# Patient Record
Sex: FEMALE | Race: WHITE | NOT HISPANIC OR LATINO
[De-identification: names, ages, dates, MRNs, and addresses within clinical notes are randomized per-mention and may not be internally consistent; named-entity substitution may affect disease eponyms.]

---

## 2021-02-10 ENCOUNTER — APPOINTMENT (OUTPATIENT)
Dept: HEMATOLOGY ONCOLOGY | Facility: CLINIC | Age: 51
End: 2021-02-10
Payer: COMMERCIAL

## 2021-02-10 VITALS
SYSTOLIC BLOOD PRESSURE: 118 MMHG | TEMPERATURE: 98.5 F | RESPIRATION RATE: 18 BRPM | BODY MASS INDEX: 24.64 KG/M2 | HEART RATE: 96 BPM | WEIGHT: 157 LBS | HEIGHT: 67 IN | OXYGEN SATURATION: 97 % | DIASTOLIC BLOOD PRESSURE: 70 MMHG

## 2021-02-10 DIAGNOSIS — Z86.39 PERSONAL HISTORY OF OTHER ENDOCRINE, NUTRITIONAL AND METABOLIC DISEASE: ICD-10-CM

## 2021-02-10 DIAGNOSIS — R59.1 GENERALIZED ENLARGED LYMPH NODES: ICD-10-CM

## 2021-02-10 DIAGNOSIS — Z86.69 PERSONAL HISTORY OF OTHER DISEASES OF THE NERVOUS SYSTEM AND SENSE ORGANS: ICD-10-CM

## 2021-02-10 PROCEDURE — 99072 ADDL SUPL MATRL&STAF TM PHE: CPT

## 2021-02-10 PROCEDURE — 99204 OFFICE O/P NEW MOD 45 MIN: CPT

## 2021-02-10 NOTE — HISTORY OF PRESENT ILLNESS
[de-identified] : 49 yo F with recently detected bilateral neck LAD and h/o sinus issues (URI etc..), hypothyroid, and tinnitus but otherwise healthy.  States she was in her usual state of health until about 1 month ago when she felt left sided cervical LN-they are not painful and do not interfere with ROM and not associated with recent infection/URI etc.. Perhaps some dyshphagia but states that she also has intermittent GERD and is now very nervous about what the LAD is.  No other associated symptoms-no weight loss, appetite loss, pain, SOB/N/V, night sweats (aside from related to menopause).  She denies any other lumps/bumps elsewhere and no pain or other somatic symptoms.  \par \par Patient works for [employer] from home in [place].  She is  with 2 children.  Denies any personal history of cancer or significant family history.  No recent exposures or prior radiation etc.. \par \par Patients primary care doctor referred patient for w/u after CT Neck revealed 3 x 1 cm Left cervical LN and 2cm R level III cervical LN on 2/2/21 in addition to a thyroid U/S done on 2/2/21 that identified the 2.7cm left sided LN in addition to 4.6 cm L thyroid lobe and 4cm R thyroid lobe without solid or cystic components. \par \par LAD has not been biopsied to date.

## 2021-02-10 NOTE — ASSESSMENT
[FreeTextEntry1] : 51 yo F with bilateral cervical neck LAD L> R up to 3cm which approaches pathological size but with no associated symptoms or concerns.  DDX include reactive LAD, lymphoma, head and neck primary, less likely thyroid primary, versus other carcinoma \par \par 1.  Reviewed with patient the need for additional w/u and the need to obtain tissue specifically-patient was in agreement.  She has seen Dr. Stein for years for sinus related issues and is comfortable going back to him for additional w/u.  \par \par Patient will need a pecutaneous biopsy to establish/refute diagnoses. \par -patient to return to Dr. Stein, as an established patient may get in quickly.  \par -patient was also given f/u options to see Suresh Pelayo or Alisha.  \par \par 2.  Age appropriate cancer screening \par -breast exam/mammo \par -colonoscopy \par \par 3.  hypothyroid \par -on synthroid \par \par 4. Tinnitus \par -occasionally takes triamterene-HCTZ \par \par 54.  Will obtain lab work once a diagnosis is made. \par \par RTC 3 weeks. \par

## 2021-02-17 ENCOUNTER — APPOINTMENT (OUTPATIENT)
Dept: OTOLARYNGOLOGY | Facility: CLINIC | Age: 51
End: 2021-02-17
Payer: COMMERCIAL

## 2021-02-17 VITALS
WEIGHT: 158 LBS | OXYGEN SATURATION: 98 % | DIASTOLIC BLOOD PRESSURE: 80 MMHG | SYSTOLIC BLOOD PRESSURE: 118 MMHG | HEIGHT: 68 IN | BODY MASS INDEX: 23.95 KG/M2 | TEMPERATURE: 97.9 F | HEART RATE: 90 BPM

## 2021-02-17 DIAGNOSIS — Z72.89 OTHER PROBLEMS RELATED TO LIFESTYLE: ICD-10-CM

## 2021-02-17 DIAGNOSIS — Z82.49 FAMILY HISTORY OF ISCHEMIC HEART DISEASE AND OTHER DISEASES OF THE CIRCULATORY SYSTEM: ICD-10-CM

## 2021-02-17 DIAGNOSIS — R59.0 LOCALIZED ENLARGED LYMPH NODES: ICD-10-CM

## 2021-02-17 DIAGNOSIS — Z87.891 PERSONAL HISTORY OF NICOTINE DEPENDENCE: ICD-10-CM

## 2021-02-17 DIAGNOSIS — Z78.9 OTHER SPECIFIED HEALTH STATUS: ICD-10-CM

## 2021-02-17 DIAGNOSIS — Z11.9 ENCOUNTER FOR SCREENING FOR INFECTIOUS AND PARASITIC DISEASES, UNSPECIFIED: ICD-10-CM

## 2021-02-17 PROCEDURE — 99204 OFFICE O/P NEW MOD 45 MIN: CPT | Mod: 25

## 2021-02-17 PROCEDURE — 99072 ADDL SUPL MATRL&STAF TM PHE: CPT

## 2021-02-17 PROCEDURE — 31575 DIAGNOSTIC LARYNGOSCOPY: CPT

## 2021-02-18 PROBLEM — Z82.49 FAMILY HISTORY OF CARDIAC DISORDER: Status: ACTIVE | Noted: 2021-02-18

## 2021-02-18 PROBLEM — Z72.89 CONSUMES ALCOHOL: Status: ACTIVE | Noted: 2021-02-18

## 2021-02-18 PROBLEM — Z78.9 CAFFEINE USE: Status: ACTIVE | Noted: 2021-02-18

## 2021-02-18 PROBLEM — Z87.891 FORMER SMOKER: Status: ACTIVE | Noted: 2021-02-18

## 2021-02-18 RX ORDER — LEVOTHYROXINE SODIUM 112 UG/1
112 TABLET ORAL
Refills: 0 | Status: ACTIVE | COMMUNITY

## 2021-02-18 NOTE — PROCEDURE
[Image(s) Captured] : image(s) captured and filed [Unable to Cooperate with Mirror] : patient unable to cooperate with mirror [de-identified] : Flexible fiberoptic laryngoscope advanced orally, tonsillar pillars and tonsils/tonsil bed visualized, oropharyngeal and hypopharyngeal walls and BOT, epiglottis all visualized, no masses, no fungating/ulcerating lesions evident; larynx visualized, true vocal cords abduct and adduct and meet in the midline no leuko/erythroplakic lesions . No pooling of secretions.

## 2021-02-18 NOTE — PHYSICAL EXAM
[Laryngoscopy Performed] : laryngoscopy was performed, see procedure section for findings [de-identified] : occipital scaly skin lesion [Normal] : assessment of respiratory effort is normal [de-identified] : bilateral lad 1-3cm, mobile, solid, non tender, levels 2-5

## 2021-02-18 NOTE — DATA REVIEWED
[de-identified] : us report [de-identified] : CT report [de-identified] : [facility] office visit report

## 2021-02-18 NOTE — HISTORY OF PRESENT ILLNESS
[de-identified] : 51 yo F , who noticed a lump of the right neck X 4 weeks. Denies recent infection, denies sore throat, denies smoking/excessive EtOH, denies WL/F/NS. she has hx of chronic sinusitis, hypothyroidism. She reports having a skin lesions - occipital + hand palm X couple of months- never evaluated. Never had FNA of the neck LN. She also reports symptoms of GERD. \par  \par 2/2/21 CT Neck (report) revealed multiple prominent level III LNs, R>L. 3 x 1 cm right cervical LN and 2 x 1 cm L cervical LN. 2/2/21 US thyroid (report): 2.7cm left level III LN and adjacent 1.4 cm LN – suggesting reactive LNs.  in addition to 4.8 cm L thyroid lobe and 4.6 cm R thyroid lobe without solid or cystic components.

## 2021-02-18 NOTE — ASSESSMENT
[FreeTextEntry1] : 51yo F, p/w ryan. neck LAD X 4 weeks, up to 3 cm per imaging, never been fna/bx. She also has an occipital skin lesion that had never been evaluated. \par We thoroughly discussed the diagnosis with the patient and explained the required workup, f/u and treatment options.\par \par \par Plan:\par - Obtain CD of CT neck and US thyroid from [facility]\par - Obtain auth FNA LN, f/u with results. \par - Refer to GI Dr. Ramos\par - f/u derm re scalp\par - RTC sooner should any worrisome symptoms develop.\par - Pt verbalized understanding of above.\par

## 2021-02-25 ENCOUNTER — RESULT REVIEW (OUTPATIENT)
Age: 51
End: 2021-02-25

## 2021-02-25 ENCOUNTER — APPOINTMENT (OUTPATIENT)
Dept: ULTRASOUND IMAGING | Facility: HOSPITAL | Age: 51
End: 2021-02-25
Payer: COMMERCIAL

## 2021-02-25 ENCOUNTER — OUTPATIENT (OUTPATIENT)
Dept: OUTPATIENT SERVICES | Facility: HOSPITAL | Age: 51
LOS: 1 days | End: 2021-02-25
Payer: COMMERCIAL

## 2021-02-25 DIAGNOSIS — Z98.89 OTHER SPECIFIED POSTPROCEDURAL STATES: Chronic | ICD-10-CM

## 2021-02-25 PROCEDURE — 88173 CYTOPATH EVAL FNA REPORT: CPT | Mod: 26

## 2021-02-25 PROCEDURE — 10005 FNA BX W/US GDN 1ST LES: CPT

## 2021-02-25 PROCEDURE — 88305 TISSUE EXAM BY PATHOLOGIST: CPT

## 2021-02-25 PROCEDURE — 10006 FNA BX W/US GDN EA ADDL: CPT

## 2021-02-25 PROCEDURE — 88173 CYTOPATH EVAL FNA REPORT: CPT

## 2021-02-25 PROCEDURE — 88305 TISSUE EXAM BY PATHOLOGIST: CPT | Mod: 26

## 2021-02-26 LAB
NON-GYNECOLOGICAL CYTOLOGY STUDY: SIGNIFICANT CHANGE UP
NON-GYNECOLOGICAL CYTOLOGY STUDY: SIGNIFICANT CHANGE UP

## 2021-03-02 ENCOUNTER — APPOINTMENT (OUTPATIENT)
Dept: HEMATOLOGY ONCOLOGY | Facility: CLINIC | Age: 51
End: 2021-03-02
Payer: COMMERCIAL

## 2021-03-04 NOTE — ASSESSMENT
[FreeTextEntry1] : 49 yo F with bilateral cervical neck LAD L> R up to 3cm which approaches pathological size but with no associated symptoms or concerns.  DDX include reactive LAD, lymphoma, head and neck primary, less likely thyroid primary, versus other carcinoma \par \par 1.  Reviewed with patient the need for additional w/u and the need to obtain tissue specifically-patient was in agreement.  She has seen Dr. Stein for years for sinus related issues and is comfortable going back to him for additional w/u.  \par \par Patient will need a pecutaneous biopsy to establish/refute diagnoses. \par -patient to return to Dr. Stein, as an established patient may get in quickly.  \par -patient was also given f/u options to see Suresh Pelayo or Alisha.  \par \par 2.  Age appropriate cancer screening \par -breast exam/mammo \par -colonoscopy \par \par 3.  hypothyroid \par -on synthroid \par \par 4. Tinnitus \par -occasionally takes triamterene-HCTZ \par \par 54.  Will obtain lab work once a diagnosis is made. \par \par RTC 3 weeks. \par

## 2021-03-04 NOTE — HISTORY OF PRESENT ILLNESS
[de-identified] : 49 yo F with recently detected bilateral neck LAD and h/o sinus issues (URI etc..), hypothyroid, and tinnitus but otherwise healthy.  States she was in her usual state of health until about 1 month ago when she felt left sided cervical LN-they are not painful and do not interfere with ROM and not associated with recent infection/URI etc.. Perhaps some dyshphagia but states that she also has intermittent GERD and is now very nervous about what the LAD is.  No other associated symptoms-no weight loss, appetite loss, pain, SOB/N/V, night sweats (aside from related to menopause).  She denies any other lumps/bumps elsewhere and no pain or other somatic symptoms.  \par \par Patient works for [employer] from home in [place].  She is  with 2 children.  Denies any personal history of cancer or significant family history.  No recent exposures or prior radiation etc.. \par \par Patients primary care doctor referred patient for w/u after CT Neck revealed 3 x 1 cm Left cervical LN and 2cm R level III cervical LN on 2/2/21 in addition to a thyroid U/S done on 2/2/21 that identified the 2.7cm left sided LN in addition to 4.6 cm L thyroid lobe and 4cm R thyroid lobe without solid or cystic components. \par \par LAD has not been biopsied to date.  \par \par 2/2/21 CT SOFT TISSUE NECK WO CONTRAST:\par - Multiple level III lymph nodes are noted. These are more numerous and somewhat larger on the right than left measuring up to 3 x 1 cm on the right and 2 x 1 cm on the left. These prominent lymph nodes appear uniform in density as visualized without intravenous contrast.\par - Correlated with clinical palpation. No discrete findings to indicate caseous necrosis.\par - No pharyngeal wall lesion is identified.\par - Unenhanced salivary glands appear unremarkable\par - Absence of portion of the medial wall of the right and left maxillary sinus and floor of the ethmoid sinus, presumed to be secondary to prior sinus surgery\par - Etiology of the cervical lymphadenopathy cannot be determined from the current CT exam. Correlate with clinical physical exam. Findings may be reactive secondary to infectious or inflammatory process but lymphoproliferative/neoplastic etiologies cannot be excluded. \par - Further more detailed soft tissue evaluation can be obtained with MRI of the soft tissues of the neck pre and post gadolinium\par \par 2/2/21 THYROID US:\par - Representative images demonstrate the right lobe to measure 4.6 x 1.9 x 1.5 cm and the left lobe to measure 4.8 x 1.4 x 1.2 cm. The isthmus measures 0.3 cm.\par - Thyroid gland is heterogeneous in echogenicity and mildly hypervascular but without discrete cystic or solid nodules.\par - Left level III lymph node w/o fatty hilum measuring 2.7 cm in length by diameter of 1.2 x 0.7 cm. Second adjacent lymph node without fatty hilum measuring 1.4 x 1.0 x 0.4 cm. Findings would suggest reactive lymph nodes.\par - Nonspecific reactive lymphadenopathy. Correlate clinically.\par - ACR TI-RADS\par - TR 1-2 (Benign) up to 2% cancer risk\par - TR 3 (mildly suspicious) up to 5% cancer risk. FNA >= 2.5 cm and Followup if >=1.5 cm. Followup: 1,3 and 5 years\par - TR 4 (moderately suspicious) 5- 20% cancer risk. FNA >= 1.5 cm and Followup if >= 1.0 cm. Follow up: 1,3 and 5 years\par - TR 5 (highly suspicious) > 20% cancer risk. FNA >= 1.0 cm and followup if >= 0.5 cm. Follow up: Annual follow-up for up to 5 years\par \par 2/25/21 LYMPH NODE, LEFT NECK, LEVEL III, LEVEL III, FINE NEEDLE ASPIRATION BX:\par - NEGATIVE FOR MALIGNANT CELLS\par - Heterogeneous population of lymphocytes including tingible body\par macrophages\par  [de-identified] : 3/2/21: Patient returns for follow-up visit. He saw Dr. Pelayo and is s/p FNA to left neck lymph nodes on 2/25/21, negative for malignant cells.

## 2021-03-09 ENCOUNTER — LABORATORY RESULT (OUTPATIENT)
Age: 51
End: 2021-03-09

## 2021-03-09 ENCOUNTER — APPOINTMENT (OUTPATIENT)
Dept: HEMATOLOGY ONCOLOGY | Facility: CLINIC | Age: 51
End: 2021-03-09
Payer: COMMERCIAL

## 2021-03-09 VITALS
SYSTOLIC BLOOD PRESSURE: 104 MMHG | HEART RATE: 82 BPM | DIASTOLIC BLOOD PRESSURE: 71 MMHG | HEIGHT: 68 IN | TEMPERATURE: 98.2 F | BODY MASS INDEX: 24.71 KG/M2 | OXYGEN SATURATION: 97 % | RESPIRATION RATE: 18 BRPM | WEIGHT: 163 LBS

## 2021-03-09 PROCEDURE — 99214 OFFICE O/P EST MOD 30 MIN: CPT

## 2021-03-09 PROCEDURE — 99072 ADDL SUPL MATRL&STAF TM PHE: CPT

## 2021-03-09 NOTE — REVIEW OF SYSTEMS
[Swollen Glands] : swollen glands [Fever] : no fever [Night Sweats] : no night sweats [Fatigue] : no fatigue [Eye Pain] : no eye pain [Vision Problems] : no vision problems [Dysphagia] : no dysphagia [Hoarseness] : no hoarseness [Odynophagia] : no odynophagia [Chest Pain] : no chest pain [Shortness Of Breath] : no shortness of breath [Wheezing] : no wheezing [Cough] : no cough [SOB on Exertion] : no shortness of breath during exertion [Abdominal Pain] : no abdominal pain [Vomiting] : no vomiting [Constipation] : no constipation [Diarrhea] : no diarrhea [Incontinence] : no incontinence [Joint Pain] : no joint pain [Joint Stiffness] : no joint stiffness [Muscle Pain] : no muscle pain [Skin Rash] : no skin rash [Confused] : no confusion

## 2021-03-09 NOTE — PHYSICAL EXAM
[Normal] : affect appropriate [de-identified] : n [de-identified] : no LAD  [de-identified] : NO LAD in inguinals

## 2021-03-09 NOTE — HISTORY OF PRESENT ILLNESS
[de-identified] : 49 yo F with recently detected bilateral neck LAD and h/o sinus issues (URI etc..), hypothyroid, and tinnitus but otherwise healthy.  States she was in her usual state of health until about 1 month ago when she felt left sided cervical LN-they are not painful and do not interfere with ROM and not associated with recent infection/URI etc.. Perhaps some dyshphagia but states that she also has intermittent GERD and is now very nervous about what the LAD is.  No other associated symptoms-no weight loss, appetite loss, pain, SOB/N/V, night sweats (aside from related to menopause).  She denies any other lumps/bumps elsewhere and no pain or other somatic symptoms.  \par \par Patient works for [employer] from home in [place].  She is  with 2 children.  Denies any personal history of cancer or significant family history.  No recent exposures or prior radiation etc.. \par \par Patients primary care doctor referred patient for w/u after CT Neck revealed 3 x 1 cm Left cervical LN and 2cm R level III cervical LN on 2/2/21 in addition to a thyroid U/S done on 2/2/21 that identified the 2.7cm left sided LN in addition to 4.6 cm L thyroid lobe and 4cm R thyroid lobe without solid or cystic components. \par \par LAD has not been biopsied to date.  \par \par 2/2/21 CT SOFT TISSUE NECK WO CONTRAST:\par - Multiple level III lymph nodes are noted. These are more numerous and somewhat larger on the right than left measuring up to 3 x 1 cm on the right and 2 x 1 cm on the left. These prominent lymph nodes appear uniform in density as visualized without intravenous contrast.\par - Correlated with clinical palpation. No discrete findings to indicate caseous necrosis.\par - No pharyngeal wall lesion is identified.\par - Unenhanced salivary glands appear unremarkable\par - Absence of portion of the medial wall of the right and left maxillary sinus and floor of the ethmoid sinus, presumed to be secondary to prior sinus surgery\par - Etiology of the cervical lymphadenopathy cannot be determined from the current CT exam. Correlate with clinical physical exam. Findings may be reactive secondary to infectious or inflammatory process but lymphoproliferative/neoplastic etiologies cannot be excluded. \par - Further more detailed soft tissue evaluation can be obtained with MRI of the soft tissues of the neck pre and post gadolinium\par \par 2/2/21 THYROID US:\par - Representative images demonstrate the right lobe to measure 4.6 x 1.9 x 1.5 cm and the left lobe to measure 4.8 x 1.4 x 1.2 cm. The isthmus measures 0.3 cm.\par - Thyroid gland is heterogeneous in echogenicity and mildly hypervascular but without discrete cystic or solid nodules.\par - Left level III lymph node w/o fatty hilum measuring 2.7 cm in length by diameter of 1.2 x 0.7 cm. Second adjacent lymph node without fatty hilum measuring 1.4 x 1.0 x 0.4 cm. Findings would suggest reactive lymph nodes.\par - Nonspecific reactive lymphadenopathy. Correlate clinically.\par - ACR TI-RADS\par - TR 1-2 (Benign) up to 2% cancer risk\par - TR 3 (mildly suspicious) up to 5% cancer risk. FNA >= 2.5 cm and Followup if >=1.5 cm. Followup: 1,3 and 5 years\par - TR 4 (moderately suspicious) 5- 20% cancer risk. FNA >= 1.5 cm and Followup if >= 1.0 cm. Follow up: 1,3 and 5 years\par - TR 5 (highly suspicious) > 20% cancer risk. FNA >= 1.0 cm and followup if >= 0.5 cm. Follow up: Annual follow-up for up to 5 years\par \par 2/25/21 LYMPH NODE, LEFT NECK, LEVEL III, LEVEL III, FINE NEEDLE ASPIRATION BX:\par - NEGATIVE FOR MALIGNANT CELLS\par - Heterogeneous population of lymphocytes including tingible body\par macrophages\par  [de-identified] : 3/2/21: Patient returns for follow-up visit. He saw Dr. Pelayo and is s/p FNA to left neck lymph nodes on 2/25/21, negative for malignant cells.\par Mass on neck has decreased significantly.  Patient is concerned about lumps/bumps in Left groin.  No associated symptoms of fevers/chills, pain, weight loss etc.. feels well otehrwise.

## 2021-03-09 NOTE — ASSESSMENT
[FreeTextEntry1] : 51 yo F with previous bilateral cervical neck LAD L> R up to 3cm with no evidence of malignancy on FNA (US guided).  DDX include reactive LAD, lymphoma, head and neck primary, less likely thyroid primary, versus other carcinoma \par \par 1.  Reviewed possibility for false negative but mass has abated without any other symptoms.  Most likely this was reactive to transient infection or other process.  \par -will reassess with CT NEck/Chest for LAD in two months.  if negative, would recommend age appropriate cancer screening.  (mammo UTD, needs colonoscopy) \par -f/u blood work from today.  \par \par 2.  hypothyroid \par -on synthroid \par \par 3. Tinnitus \par -occasionally takes triamterene-HCTZ \par \par RTC 2 months \par  [Curative] : Goals of care discussed with patient: Curative

## 2021-03-16 LAB
BASOPHILS # BLD AUTO: 0.03 K/UL
BASOPHILS NFR BLD AUTO: 0.5 %
EOSINOPHIL # BLD AUTO: 0.09 K/UL
EOSINOPHIL NFR BLD AUTO: 1.4 %
HCT VFR BLD CALC: 38.3 %
HGB BLD-MCNC: 13.1 G/DL
IMM GRANULOCYTES NFR BLD AUTO: 0.2 %
LYMPHOCYTES # BLD AUTO: 2.46 K/UL
LYMPHOCYTES NFR BLD AUTO: 38.5 %
MAN DIFF?: NORMAL
MCHC RBC-ENTMCNC: 31 PG
MCHC RBC-ENTMCNC: 34.2 GM/DL
MCV RBC AUTO: 90.5 FL
MONOCYTES # BLD AUTO: 0.42 K/UL
MONOCYTES NFR BLD AUTO: 6.6 %
NEUTROPHILS # BLD AUTO: 3.38 K/UL
NEUTROPHILS NFR BLD AUTO: 52.8 %
PLATELET # BLD AUTO: 248 K/UL
RBC # BLD: 4.23 M/UL
RBC # FLD: 12.9 %
WBC # FLD AUTO: 6.39 K/UL

## 2022-10-03 ENCOUNTER — RESULT REVIEW (OUTPATIENT)
Age: 52
End: 2022-10-03

## 2023-09-11 ENCOUNTER — EMERGENCY (EMERGENCY)
Facility: HOSPITAL | Age: 53
LOS: 1 days | Discharge: HOME | End: 2023-09-11
Attending: EMERGENCY MEDICINE | Admitting: STUDENT IN AN ORGANIZED HEALTH CARE EDUCATION/TRAINING PROGRAM
Payer: COMMERCIAL

## 2023-09-11 VITALS
RESPIRATION RATE: 18 BRPM | HEART RATE: 73 BPM | OXYGEN SATURATION: 99 % | DIASTOLIC BLOOD PRESSURE: 66 MMHG | SYSTOLIC BLOOD PRESSURE: 113 MMHG | TEMPERATURE: 99 F

## 2023-09-11 DIAGNOSIS — Z98.89 OTHER SPECIFIED POSTPROCEDURAL STATES: Chronic | ICD-10-CM

## 2023-09-11 LAB
ALBUMIN SERPL ELPH-MCNC: 4.3 G/DL — SIGNIFICANT CHANGE UP (ref 3.3–5)
ALP SERPL-CCNC: 60 U/L — SIGNIFICANT CHANGE UP (ref 40–120)
ALT FLD-CCNC: 13 U/L — SIGNIFICANT CHANGE UP (ref 4–33)
ANION GAP SERPL CALC-SCNC: 14 MMOL/L — SIGNIFICANT CHANGE UP (ref 7–14)
AST SERPL-CCNC: 19 U/L — SIGNIFICANT CHANGE UP (ref 4–32)
B BURGDOR C6 AB SER-ACNC: NEGATIVE — SIGNIFICANT CHANGE UP
B BURGDOR IGG+IGM SER-ACNC: 0.18 INDEX — SIGNIFICANT CHANGE UP (ref 0.01–0.89)
BASOPHILS # BLD AUTO: 0.03 K/UL — SIGNIFICANT CHANGE UP (ref 0–0.2)
BASOPHILS NFR BLD AUTO: 0.6 % — SIGNIFICANT CHANGE UP (ref 0–2)
BILIRUB SERPL-MCNC: 0.5 MG/DL — SIGNIFICANT CHANGE UP (ref 0.2–1.2)
BUN SERPL-MCNC: 13 MG/DL — SIGNIFICANT CHANGE UP (ref 7–23)
CALCIUM SERPL-MCNC: 9.9 MG/DL — SIGNIFICANT CHANGE UP (ref 8.4–10.5)
CHLORIDE SERPL-SCNC: 100 MMOL/L — SIGNIFICANT CHANGE UP (ref 98–107)
CO2 SERPL-SCNC: 21 MMOL/L — LOW (ref 22–31)
CREAT SERPL-MCNC: 0.8 MG/DL — SIGNIFICANT CHANGE UP (ref 0.5–1.3)
EGFR: 88 ML/MIN/1.73M2 — SIGNIFICANT CHANGE UP
EOSINOPHIL # BLD AUTO: 0.07 K/UL — SIGNIFICANT CHANGE UP (ref 0–0.5)
EOSINOPHIL NFR BLD AUTO: 1.3 % — SIGNIFICANT CHANGE UP (ref 0–6)
GLUCOSE SERPL-MCNC: 86 MG/DL — SIGNIFICANT CHANGE UP (ref 70–99)
HCT VFR BLD CALC: 36.1 % — SIGNIFICANT CHANGE UP (ref 34.5–45)
HGB BLD-MCNC: 12.1 G/DL — SIGNIFICANT CHANGE UP (ref 11.5–15.5)
IANC: 1.99 K/UL — SIGNIFICANT CHANGE UP (ref 1.8–7.4)
IMM GRANULOCYTES NFR BLD AUTO: 0.2 % — SIGNIFICANT CHANGE UP (ref 0–0.9)
LYMPHOCYTES # BLD AUTO: 2.52 K/UL — SIGNIFICANT CHANGE UP (ref 1–3.3)
LYMPHOCYTES # BLD AUTO: 46.2 % — HIGH (ref 13–44)
MCHC RBC-ENTMCNC: 30 PG — SIGNIFICANT CHANGE UP (ref 27–34)
MCHC RBC-ENTMCNC: 33.5 GM/DL — SIGNIFICANT CHANGE UP (ref 32–36)
MCV RBC AUTO: 89.4 FL — SIGNIFICANT CHANGE UP (ref 80–100)
MONOCYTES # BLD AUTO: 0.83 K/UL — SIGNIFICANT CHANGE UP (ref 0–0.9)
MONOCYTES NFR BLD AUTO: 15.2 % — HIGH (ref 2–14)
NEUTROPHILS # BLD AUTO: 1.99 K/UL — SIGNIFICANT CHANGE UP (ref 1.8–7.4)
NEUTROPHILS NFR BLD AUTO: 36.5 % — LOW (ref 43–77)
NRBC # BLD: 0 /100 WBCS — SIGNIFICANT CHANGE UP (ref 0–0)
NRBC # FLD: 0 K/UL — SIGNIFICANT CHANGE UP (ref 0–0)
PLATELET # BLD AUTO: 202 K/UL — SIGNIFICANT CHANGE UP (ref 150–400)
POTASSIUM SERPL-MCNC: 4 MMOL/L — SIGNIFICANT CHANGE UP (ref 3.5–5.3)
POTASSIUM SERPL-SCNC: 4 MMOL/L — SIGNIFICANT CHANGE UP (ref 3.5–5.3)
PROT SERPL-MCNC: 7.9 G/DL — SIGNIFICANT CHANGE UP (ref 6–8.3)
RBC # BLD: 4.04 M/UL — SIGNIFICANT CHANGE UP (ref 3.8–5.2)
RBC # FLD: 12.3 % — SIGNIFICANT CHANGE UP (ref 10.3–14.5)
SODIUM SERPL-SCNC: 135 MMOL/L — SIGNIFICANT CHANGE UP (ref 135–145)
WBC # BLD: 5.45 K/UL — SIGNIFICANT CHANGE UP (ref 3.8–10.5)
WBC # FLD AUTO: 5.45 K/UL — SIGNIFICANT CHANGE UP (ref 3.8–10.5)

## 2023-09-11 PROCEDURE — 99223 1ST HOSP IP/OBS HIGH 75: CPT

## 2023-09-11 PROCEDURE — 70487 CT MAXILLOFACIAL W/DYE: CPT | Mod: 26,MA

## 2023-09-11 RX ORDER — AMPICILLIN SODIUM AND SULBACTAM SODIUM 250; 125 MG/ML; MG/ML
3 INJECTION, POWDER, FOR SUSPENSION INTRAMUSCULAR; INTRAVENOUS EVERY 6 HOURS
Refills: 0 | Status: DISCONTINUED | OUTPATIENT
Start: 2023-09-11 | End: 2023-09-14

## 2023-09-11 RX ORDER — AMPICILLIN SODIUM AND SULBACTAM SODIUM 250; 125 MG/ML; MG/ML
1.5 INJECTION, POWDER, FOR SUSPENSION INTRAMUSCULAR; INTRAVENOUS ONCE
Refills: 0 | Status: COMPLETED | OUTPATIENT
Start: 2023-09-11 | End: 2023-09-11

## 2023-09-11 RX ORDER — AMPICILLIN SODIUM AND SULBACTAM SODIUM 250; 125 MG/ML; MG/ML
INJECTION, POWDER, FOR SUSPENSION INTRAMUSCULAR; INTRAVENOUS
Refills: 0 | Status: DISCONTINUED | OUTPATIENT
Start: 2023-09-11 | End: 2023-09-11

## 2023-09-11 RX ORDER — LEVOTHYROXINE SODIUM 125 MCG
112 TABLET ORAL DAILY
Refills: 0 | Status: DISCONTINUED | OUTPATIENT
Start: 2023-09-11 | End: 2023-09-14

## 2023-09-11 RX ADMIN — AMPICILLIN SODIUM AND SULBACTAM SODIUM 200 GRAM(S): 250; 125 INJECTION, POWDER, FOR SUSPENSION INTRAMUSCULAR; INTRAVENOUS at 13:54

## 2023-09-11 RX ADMIN — AMPICILLIN SODIUM AND SULBACTAM SODIUM 200 GRAM(S): 250; 125 INJECTION, POWDER, FOR SUSPENSION INTRAMUSCULAR; INTRAVENOUS at 21:15

## 2023-09-11 NOTE — ED PROVIDER NOTE - OBJECTIVE STATEMENT
no reported past medical hx who presents to the ED with Facial Swelling. patient states that she had a cut on her forehead approximately 1 week ago. Has had worsening facial redness and swelling since that time. Went to Urgent Care 9/9 and was prescribed Augmentin and Bactrim which she has taken for slightly less than 48 hours. However, she has not improved and feels that the swelling and redness is getting worse. + Fever, last Saturday (102.7 tmax). Notes pressure feeling around the eye and face but no pain. No pain with EOM. Vision unchanged when she assists her eye being opened.

## 2023-09-11 NOTE — ED PROVIDER NOTE - CLINICAL SUMMARY MEDICAL DECISION MAKING FREE TEXT BOX
52F presents to the ED with 1 w of worsening ingrid-orbital erythema/edema despite abx       Assessment and Impression   Likely preseptal cellulitis, however given pt not progressing on abx will obtain CT to eval for ingrid-orbital cellulitis. Given rash crosses midline doubt herpes zoster. Patient does state she has hiked in [facility] but did not note a tick bite - will send lyme titers to further eval but seems less likely than staph/strep etiology. No signs on exam of intraocular involvement.

## 2023-09-11 NOTE — ED PROVIDER NOTE - ATTENDING CONTRIBUTION TO CARE
Patient is a 52-year-old female who  reports a history of hypothyroidism here for evaluation of right-sided periorbital rash and swelling x4 days.  Patient states that she popped a pimple on Tuesday or Wednesday of last week.  She states she woke up on Friday swelling and rash around her right eye.  She went to urgent care on Saturday and was prescribed Bactrim and Augmentin.   Patient states she has been taking her medications but has gotten worse.  Had a fever on Friday and Saturday, Tmax of 103.  She denies any chest pain, shortness of breath, nausea, vomiting, sore throat, cough, abdominal pain.  She reports discomfort around her right eye with increased swelling.  She is unable to open her right eye.  She denies any ocular pain.  She has no visual changes outside of being unable to open her eye.  She reports that they have been hiking in [facility] and did get multiple bites.      VS noted  Gen. no acute distress, Non toxic   HEENT: EOMI, normal conjunctiva, mmm, right periorbital swelling, erythema, warmth and tenderness, honey crusting visible over right eyebrow, no vesicular lesions, no Nichole's sign, normal pharynx  Lungs: CTAB/L no C/ W /R   CVS: RRR   Abd; Soft non tender, non distended   Ext: no edema, no joint swelling  Skin: no rash  Neuro AAOx3 non focal clear speech  a/p: right periorbital swelling, erythema with visible honey crusted lesions - With honey crusting, concern for impetigo. Increased swelling and erythema is concerning for preseptal cellulitis/ facial cellulitis. No ocular involvement at this time. Patient is on Bactrim and Augmentin. Last dose was this morning. She has been taking benadryl for itchiness. Denies any significant pain at this time. Plan for labs, CT Max/Face. Consider CDU vs admission for monitoring, IV antibiotics.   - Sumit SANDERS

## 2023-09-11 NOTE — ED CDU PROVIDER INITIAL DAY NOTE - CROS ED CONS ALL NEG
Spoke with patient who reports constipation, bloating and abnormal stools for 2 weeks. She denies recent travel. Today had white substance in stool. Order entered for stool testing and patient directed to go to lab. Will follow up with results. - - -

## 2023-09-11 NOTE — ED CDU PROVIDER INITIAL DAY NOTE - DETAILS
- Reassessment for improvement in preseptal cellulitis  - IV antibiotics  - ID consult  - Pain consult

## 2023-09-11 NOTE — ED CDU PROVIDER INITIAL DAY NOTE - PHYSICAL EXAMINATION
Sumit MD:      VS noted  Gen. no acute distress, Non toxic   HEENT: EOMI, normal conjunctiva, mmm, right periorbital swelling, erythema, warmth and tenderness, honey crusting visible over right eyebrow, no vesicular lesions, no Nichole's sign, normal pharynx  Lungs: CTAB/L no C/ W /R   CVS: RRR   Abd; Soft non tender, non distended   Ext: no edema, no joint swelling  Skin: no rash  Neuro AAOx3 non focal clear speech

## 2023-09-11 NOTE — ED PROVIDER NOTE - CARE PLAN
1 Principal Discharge DX:	Periorbital edema   Principal Discharge DX:	Preseptal cellulitis of right eye

## 2023-09-11 NOTE — ED ADULT NURSE NOTE - OBJECTIVE STATEMENT
A&Ox4. ambulatory. c/o fevers, right facial swelling and redness. NAD. pt denies SOB, chest pain, dizziness, weakness, urinary symptoms, HA, n/v/d, chills. respirations are even and un labored. right side of face is swollen and redness observed from forehead to lower jaw. right eye is open. no drainage observed from right eye. 20g placed to RAC. labs drawn and sent. safety precautions maintained.

## 2023-09-11 NOTE — ED CDU PROVIDER INITIAL DAY NOTE - OBJECTIVE STATEMENT
Sumit SANDERS: Patient is a 52-year-old female who  reports a history of hypothyroidism here for evaluation of right-sided periorbital rash and swelling x4 days.  Patient states that she popped a pimple on Tuesday or Wednesday of last week.  She states she woke up on Friday swelling and rash around her right eye.  She went to urgent care on Saturday and was prescribed Bactrim and Augmentin.   Patient states she has been taking her medications but has gotten worse.  Had a fever on Friday and Saturday, Tmax of 103.  She denies any chest pain, shortness of breath, nausea, vomiting, sore throat, cough, abdominal pain.  She reports discomfort around her right eye with increased swelling.  She is unable to open her right eye.  She denies any ocular pain.  She has no visual changes outside of being unable to open her eye.  She reports that they have been hiking in [facility] and did get multiple bites.    In the ED, patient had labs and a CT done. Labs are not actionable. CT showed: IMPRESSION:  1. RIGHT preseptal cellulitis and conjunctivitis, without evidence of   abscess or postseptal inflammation.  2. Borderline enlarged RIGHT submandibular and level II lymph nodes; a   nonspecific finding. While this is statistically infectious-inflammatory   in nature, neoplasm cannot be definitively excluded. Recommend clinical   correlation and follow-up as needed.

## 2023-09-11 NOTE — ED PROVIDER NOTE - NS ED ROS FT
CONSTITUTIONAL: + fever and chills. No weakness    Eyes: No visual change  · ENMT: no runny nose or sore throat  · CARDIOVASCULAR: no chest pain   · RESPIRATORY:  no cough or  shortness of breath.  · GASTROINTESTINAL: No abdominal pain, no nausea,  no diarrhea, no vomiting  · GENITOURINARY: no dysuria or increased urinary frequency  · MUSCULOSKELETAL: no back pain    NEURO: No weakness, no numbness/tingling   · ROS STATEMENT: all other ROS negative except as per HPI

## 2023-09-11 NOTE — ED PROVIDER NOTE - PHYSICAL EXAMINATION
CONSTITUTIONAL: Well appearing, awake, alert  ENMT: Airway patent, tolerating secretions.    NECK: Supple. No JVD  EYES: Clear bilaterally, pupils equal, round. VA grossly intact in bilaterally. No conjunctival injection / ciliary flush. Pupils equal / round, reactive to light. No discharge  CARDIAC: Warm, well-perfused  RESPIRATORY: Equal Chest Rise, no stridor. No respiratory distress.   GASTROINTESTINAL: Non-distended  MUSCULOSKELETAL: No gross joint deformity   NEUROLOGICAL: Alert. Speech Fluent. Attends to conversation and exam  SKIN:  Erythema in ingrid-orbital area, eyelids, forehead. Crosses midline. Mild crusting. Small area of clearing around erythema on forehead.   PSYCHIATRIC: Normal affect. Cooperative with history and exam

## 2023-09-11 NOTE — ED CDU PROVIDER INITIAL DAY NOTE - CLINICAL SUMMARY MEDICAL DECISION MAKING FREE TEXT BOX
Sumit SANDERS: 51 yo F here for right periorbital swelling, erythema with visible honey crusted lesions - With honey crusting, concern for impetigo. Increased swelling and erythema is concerning for preseptal cellulitis/ facial cellulitis. No ocular involvement at this time. Patient is on Bactrim and Augmentin. Last dose was this morning. She has been taking benadryl for itchiness. Denies any significant pain at this time. CT shows a right preseptal cellulitis. Given no improvement on oral Abx, plan for CDU for reassessment after IV antibiotics, ID consult.

## 2023-09-11 NOTE — ED ADULT TRIAGE NOTE - CHIEF COMPLAINT QUOTE
pt c/o rash to right side forehead and over right eye x 4 days. periorbital swelling noted. pt denies change in vision.

## 2023-09-12 VITALS
OXYGEN SATURATION: 100 % | HEART RATE: 73 BPM | RESPIRATION RATE: 16 BRPM | SYSTOLIC BLOOD PRESSURE: 107 MMHG | DIASTOLIC BLOOD PRESSURE: 67 MMHG | TEMPERATURE: 98 F

## 2023-09-12 PROCEDURE — 99238 HOSP IP/OBS DSCHRG MGMT 30/<: CPT

## 2023-09-12 RX ADMIN — Medication 112 MICROGRAM(S): at 06:37

## 2023-09-12 RX ADMIN — AMPICILLIN SODIUM AND SULBACTAM SODIUM 200 GRAM(S): 250; 125 INJECTION, POWDER, FOR SUSPENSION INTRAMUSCULAR; INTRAVENOUS at 09:08

## 2023-09-12 RX ADMIN — AMPICILLIN SODIUM AND SULBACTAM SODIUM 200 GRAM(S): 250; 125 INJECTION, POWDER, FOR SUSPENSION INTRAMUSCULAR; INTRAVENOUS at 03:07

## 2023-09-12 NOTE — ED CDU PROVIDER DISPOSITION NOTE - CLINICAL COURSE
THIERRY Zheng: 52-year-old female with a history of hypothyroidism presented to the emergency department complaining of redness to right-sided face.  Patient diagnosed with cellulitis, patient placed in CDU for IV antibiotics.  Patient feels better, tolerating p.o., patient wants to go home.  Discharge and results reviewed with patient.

## 2023-09-12 NOTE — ED CDU PROVIDER SUBSEQUENT DAY NOTE - NS ED ATTENDING STATEMENT MOD
This was a shared visit with the [facility]. I reviewed and verified the documentation and independently performed the documented:

## 2023-09-12 NOTE — ED CDU PROVIDER DISPOSITION NOTE - NSFOLLOWUPINSTRUCTIONS_ED_ALL_ED_FT
Follow up with your Primary Medical Doctor in 1-2 days.  Take Augmentin one tablet orally 2 x a day x 7 days.  Return to the ER for any persistent/worsening or new symptoms increased redness, swelling, fevers, chills, or any concerning symptoms.

## 2023-09-12 NOTE — ED CDU PROVIDER DISPOSITION NOTE - PATIENT PORTAL LINK FT
You can access the FollowMyHealth Patient Portal offered by [facility] by registering at the following website: [email or web address]. By joining [employer]’s FollowMyHealth portal, you will also be able to view your health information using other applications (apps) compatible with our system.

## 2023-09-12 NOTE — ED CDU PROVIDER SUBSEQUENT DAY NOTE - ATTENDING APP SHARED VISIT CONTRIBUTION OF CARE
I have evaluated the patient and agree with the documentation and assessment as made by the [facility]. We have discussed plan of care and work up for the patient.   This was a shared visit with the [facility]. I reviewed and verified the documentation and independently performed the documented:   History, Exam and Medical Decision Making.    52F, denies pmh, who presents with R sided facial swelling and redness. Sent to CDU for IV ABX and serial exams. Evaluated at bedside this morning. Denies ocular complaints. Swelling and redness improved. EOMI/PERRL. Anticipate D/C with PO ABX and outpatient followup.

## 2023-09-12 NOTE — ED CDU PROVIDER DISPOSITION NOTE - ATTENDING CONTRIBUTION TO CARE
I have evaluated the patient and agree with the documentation and assessment as made by the [facility]. We have discussed plan of care and work up for the patient.     52F, denies pmh, who presents with R sided facial swelling and redness. Sent to CDU for IV ABX and serial exams. Evaluated at bedside this morning. Denies ocular complaints. Swelling and redness improved. EOMI/PERRL. Anticipate D/C with PO ABX and outpatient followup.

## 2023-09-12 NOTE — ED CDU PROVIDER SUBSEQUENT DAY NOTE - HISTORY
Patient is a 52-year-old female with no significant past medical history presenting with a complaint of facial swelling after sustaining a cut on forehead roughly 1 week ago.  Accepted to CDU for IV antibiotics.  In interim patient is resting comfortably with no acute complaints.  Patient is pending reeval

## 2023-09-14 LAB — B BURGDOR DNA SPEC QL NAA+PROBE: NEGATIVE — SIGNIFICANT CHANGE UP

## 2023-09-16 LAB
CULTURE RESULTS: SIGNIFICANT CHANGE UP
CULTURE RESULTS: SIGNIFICANT CHANGE UP
SPECIMEN SOURCE: SIGNIFICANT CHANGE UP
SPECIMEN SOURCE: SIGNIFICANT CHANGE UP
